# Patient Record
Sex: FEMALE | Race: ASIAN | Employment: FULL TIME | URBAN - METROPOLITAN AREA
[De-identification: names, ages, dates, MRNs, and addresses within clinical notes are randomized per-mention and may not be internally consistent; named-entity substitution may affect disease eponyms.]

---

## 2018-10-09 ENCOUNTER — HOSPITAL ENCOUNTER (OUTPATIENT)
Dept: PHYSICAL THERAPY | Age: 38
Discharge: HOME | End: 2018-10-09
Payer: COMMERCIAL

## 2018-10-09 PROCEDURE — 97110 THERAPEUTIC EXERCISES: CPT

## 2018-10-09 PROCEDURE — 97162 PT EVAL MOD COMPLEX 30 MIN: CPT

## 2018-10-09 NOTE — PROGRESS NOTES
PHYSICAL THERAPY - DAILY TREATMENT NOTE Patient Name: Franchesca Elizondo        Date: 10/9/2018 : 1980   YES Patient  Verified Visit #:     Insurance: Payor: Ermalinda Phalen / Plan: [employer]  CAPITATED PT / Product Type: Commerical / In time: 1000 Out time: 1100 Total Treatment Time: 60 Medicare/BCBS Time Tracking (below) Total Timed Codes (min):   1:1 Treatment Time:    
TREATMENT AREA =  Right knee pain [M25.561] SUBJECTIVE Pain Level (on 0 to 10 scale):  0-1  / 10 Medication Changes/New allergies or changes in medical history, any new surgeries or procedures? NO    If yes, update Summary List  
Subjective Functional Status/Changes:  []  No changes reported See POC OBJECTIVE Modalities Rationale:     decrease inflammation, decrease pain and increase tissue extensibility to improve patient's ability to perform functional ADLs 
 min [] Estim, type/location:   
                                 []  att     []  unatt     []  w/US     []  w/ice    []  w/heat 
 min []  Mechanical Traction: type/lbs   
               []  pro   []  sup   []  int   []  cont    []  before manual    []  after manual  
 min []  Ultrasound, settings/location:    
 min []  Iontophoresis w/ dexamethasone, location:   
                                           []  take home patch       []  in clinic  
 min []  Ice     []  Heat    location/position:   
 min []  Vasopneumatic Device, press/temp:   
 min []  Other:   
[] Skin assessment post-treatment (if applicable):   
[]  intact    []  redness- no adverse reaction    
[]redness  adverse reaction:     
15 min Therapeutic Exercise:  [x]  See flow sheet Rationale:      increase ROM and increase strength to improve the patients ability to regain full functional mobility of R knee for ADLS and return to yoga  
 min Manual Therapy: Technique:     
[] S/DTM []IASTM []PROM [] Passive Stretching []manual TPR  [] TDN (see objective; actual needle insertion time not billed) []Jt manipulation:Gr I [] II []  III [] IV[] V[] Treatment/Area:    
Rationale:      decrease pain, increase ROM, increase tissue extensibility and decrease trigger points to improve patient's ability to  min Therapeutic Activity:   
Rationale:    increase strength, improve coordination and increase proprioception to improve the patients ability to  
 min Neuromuscular Re-ed:   
Rationale:    improve coordination, improve balance and increase proprioception to improve the patients ability to  
 min Gait Training:   
Rationale:     min Patient Education:  YES  Reviewed HEP [x]  Progressed/Changed HEP based on:   Issued written HEP and reviewed Other Objective/Functional Measures: 
 
See POC 
TE per FS Post Treatment Pain Level (on 0 to 10) scale:   0  / 10 ASSESSMENT Assessment/Changes in Function:  
 
Progressed treatment as appropriate with good patient tolerance 
  
[]  See Progress Note/Recertification Patient will continue to benefit from skilled PT services to modify and progress therapeutic interventions, address functional mobility deficits, address ROM deficits, address strength deficits, analyze and address soft tissue restrictions, analyze and cue movement patterns, analyze and modify body mechanics/ergonomics and assess and modify postural abnormalities to attain remaining goals. Progress toward goals / Updated goals: 
Progressing towards goals established in POC PLAN [x]  Upgrade activities as tolerated YES Continue plan of care  
[]  Discharge due to :   
[]  Other:   
 
Therapist: Anita Palacios, PT, DPT, MTC, CMTPT Date: 10/9/2018 Time: 3:09 PM  
 
Future Appointments Date Time Provider [facility]lee [facility]  
10/15/2018 4:00 PM Arnulfo [facility]  
10/19/2018 8:00 AM Kirstie Wadsworth, PT [facility]  
10/22/2018 4:00 PM [facility]  
 10/26/2018 12:30 PM Kirstie Rock, PT [facility]  
10/29/2018 4:30 PM Kirstie Rock, PT [facility]  
11/2/2018 8:00 AM Brayden Basilio [facility]  
11/5/2018 4:00 PM Frank Charles [facility]  
11/9/2018 8:00 AM Francy Arredondo, PT [facility]

## 2018-10-09 NOTE — PROGRESS NOTES
[facility]  [facility] AT [address] [place] [address] 
Nasir [facility], [address] [place] [id number], [address]  Phone: [phone]  Fax: [phone] PLAN OF CARE / STATEMENT OF MEDICAL NECESSITY FOR PHYSICAL THERAPY SERVICES Patient Name: Tawanna Jones : 1863 [address]  
Diagnosis: Right knee pain [M25.561] Treatment Diagnosis: S/P R knee medial and lateral meniscectomies R knee pain Onset Date: 10/3/18 Referral Source: Vita Ar[facility]): 10/9/2018 Prior Hospitalization: See medical history Provider #: [id number] Prior Level of Function: yoag Comorbidities: L hip labral repair '12, L knee meniscectomy '07, R ACLR '07 Medications: Verified on Patient Summary List  
The Plan of Care and following information is based on the information from the initial evaluation.  
=========================================================================================== Assessment / key information: Patient is a 41 y.o. female who presents to [facility] at [facility] S/P R knee medial and lateral meniscectomies and notchplasty on 10/3/18. Patient reports intermittent R knee pain for the last 6 years that was exacerbated 5 mo ago when performing knee flexion yoga poses. She also reports R ACL laxity. Objective PT examination findings include (1) knee ROM WNL (2) good QS, no lag during SLR (3) lacking full knee ext during gait (4) swelling. A home exercise program was demonstrated and provided to address the above objective and functional deficits. Patient can benefit from skilled PT interventions to improve strength, decrease pain, to facilitate performance of ADLs & improve overall functional status. =========================================================================================== Eval Complexity: History MEDIUM  Complexity : 1-2 comorbidities / personal factors will impact the outcome/ POC ;  Examination  MEDIUM Complexity : 3 Standardized tests and measures addressing body structure, function, activity limitation and / or participation in recreation ; Presentation MEDIUM Complexity : Evolving with changing characteristics ; Decision Making MEDIUM Complexity : FOTO score of 26-74; Overall Complexity MEDIUM Problem List: pain affecting function, decrease ROM, decrease strength, edema affecting function, impaired gait/ balance, decrease ADL/ functional abilitiies, decrease activity tolerance, decrease flexibility/ joint mobility and decrease transfer abilities, FOTO score 45 Treatment Plan may include any combination of the following: Therapeutic exercise, Therapeutic activities, Neuromuscular re-education, Physical agent/modality, Gait/balance training, Manual therapy including dry needling, Aquatic therapy and Patient education Patient / Family readiness to learn indicated by: asking questions, trying to perform skills and interestPersons(s) to be included in education: patient (P) Barriers to Learning/Limitations: no 
Measures taken:   
Patient Goal (s):\"full ROM, running in 6-8 weeks, strength\" Patient self reported health status: fair Rehabilitation Potential: good? Short Term Goals: To be accomplished in  1-2  weeks: 
1) Patient to be independent and compliant with initial HEP to prevent further disability. 2) Improve FOTO score to 52 indicating significant functional improvement. 3) Patient will perform strong QS and demonstrate no lag with SLR in order to maintain stable TKE during gait. ? Long Term Goals: To be accomplished in  3-4  weeks: 
1) Patient to be independent & compliant with a progressive, high level HEP in order to maintain gains made in physical therapy. 2) Improve FOTO score to 59 indicating significant functional improvement in order to return to PLOF. 3) Patient will demonstrate good eccentric quad control in lateral step down off 6' step with no compensation to facilitate normalized gait pattern for stair negotiation. 4) Patient able to return to running with = gait components bilaterally. Frequency / Duration:   Patient to be seen  2-3  times per week for 3-4  weeks: 
Patient / Caregiver education and instruction: self care, activity modification and exercises G-Codes (GP): JOHAN Therapist Signature: Majorie Favre, PT, DPT, MTC, CMTPT Date: 10/9/2018 Certification Period: NA Time: 10:16 AM  
=========================================================================================== I certify that the above Physical Therapy Services are being furnished while the patient is under my care. I agree with the treatment plan and certify that this therapy is necessary. Physician Signature:       Date:      Time:  Please sign and return to [facility] [place] or you may fax the signed copy to [phone]. Thank you.

## 2018-10-12 ENCOUNTER — HOSPITAL ENCOUNTER (OUTPATIENT)
Dept: PHYSICAL THERAPY | Age: 38
Discharge: HOME | End: 2018-10-12
Payer: COMMERCIAL

## 2018-10-12 PROCEDURE — 97110 THERAPEUTIC EXERCISES: CPT

## 2018-10-15 ENCOUNTER — HOSPITAL ENCOUNTER (OUTPATIENT)
Dept: PHYSICAL THERAPY | Age: 38
Discharge: HOME | End: 2018-10-15
Payer: COMMERCIAL

## 2018-10-15 PROCEDURE — 97110 THERAPEUTIC EXERCISES: CPT

## 2018-10-15 NOTE — PROGRESS NOTES
PHYSICAL THERAPY - DAILY TREATMENT NOTE Patient Name: Jennifer Yousif        Date: 10/15/2018 : 1980   yes Patient  Verified Visit #:   3   of   12  Insurance: Payor: Saurav Johnson / Plan: [employer]  CAPITATED PT / Product Type: Commerical / In time: 4:00 Out time: 4:38 Total Treatment Time: 45 Medicare/BCBS Time Tracking (below) Total Timed Codes (min):  N/A 1:1 Treatment Time:  N/A  
TREATMENT AREA =  Right knee pain [M25.561] SUBJECTIVE Pain Level (on 0 to 10 scale):  1  / 10 Medication Changes/New allergies or changes in medical history, any new surgeries or procedures?    no  If yes, update Summary List  
Subjective Functional Status/Changes:  []  No changes reported \"The pain is barely there. It just feels weak. I've been doing the exercises and icing at home. \" OBJECTIVE Modalities Rationale:     decrease edema, decrease inflammation and increase tissue extensibility to improve patient's ability to perform functional ADLs 
 min [] Estim, type/location:   
                                 []  att     []  unatt     []  w/US     []  w/ice    []  w/heat 
 min []  Mechanical Traction: type/lbs   
               []  pro   []  sup   []  int   []  cont    []  before manual    []  after manual  
 min []  Ultrasound, settings/location:    
 min []  Iontophoresis w/ dexamethasone, location:   
                                           []  take home patch       []  in clinic  
10 min [x]  Ice     []  Heat    location/position: To R knee in supine with wedge post-session  
 min []  Vasopneumatic Device, press/temp:   
 min []  Other:   
[] Skin assessment post-treatment (if applicable):   
[]  intact    []  redness- no adverse reaction    
[]redness  adverse reaction:     
28 min Therapeutic Exercise:  [x]  See flow sheet Rationale:      increase ROM and increase strength to improve the patients ability to regain full, pain free R knee functional mobility for yoga activities. min Manual Therapy:   
Rationale:      min Therapeutic Activity:   
Rationale:   
 
 min Neuromuscular Re-ed:   
Rationale: 
 min Gait Training:   
Rationale:     min Patient Education:  yes  Reviewed HEP []  Progressed/Changed HEP based on: Other Objective/Functional Measures: 
 
TE per flowsheet Added 6 inch step ups Post Treatment Pain Level (on 0 to 10) scale:   0  / 10 ASSESSMENT Assessment/Changes in Function:  
 
Noted minimal extensor lag during SLR; req'd cues for quad activation prior to SLR 
  
[]  See Progress Note/Recertification Patient will continue to benefit from skilled PT services to modify and progress therapeutic interventions, address functional mobility deficits, address ROM deficits, address strength deficits, analyze and address soft tissue restrictions, analyze and cue movement patterns, analyze and modify body mechanics/ergonomics and instruct in home and community integration to attain remaining goals. Progress toward goals / Updated goals: 
Good progress towards STG #3 PLAN 
[]  Upgrade activities as tolerated yes Continue plan of care  
[]  Discharge due to :   
[]  Other:   
 
Therapist: FADY Akins Date: 10/15/2018 Time: 4:46 PM  
 
Future Appointments Date Time Provider Kaylee Day 10/15/2018 4:00 PM Korina Cortés [facility]  
10/19/2018 8:00 AM Kirstie [place]readwell [place] [facility]  
10/22/2018 4:00 PM Korina Cortés [facility]  
10/26/2018 12:30 PM Kirstie Treadwell [place] [facility]  
10/29/2018 4:30 PM Kirstie Treadwell [place] [facility]  
11/2/2018 8:00 AM Michelle Basilio [facility]  
11/5/2018 4:00 PM Korina Cortés [facility]  
11/9/2018 8:00 AM Nida Balderas [place] [facility]

## 2018-10-16 NOTE — PROGRESS NOTES
PHYSICAL THERAPY - DAILY TREATMENT NOTE Patient Name: Tatyana Urias        Date: 10/16/2018 : 1980   yes Patient  Verified Visit #:     Insurance: Payor: Shital Napoles / Plan: [facility] [employer]  CAPITATED PT / Product Type: Commerical / In time: - Out time: - Total Treatment Time: - Medicare/ [facility] Time Tracking (below) Total Timed Codes (min):  - 1:1 Treatment Time:  -  
TREATMENT AREA =  Right knee pain [M25.561] SUBJECTIVE Pain Level (on 0 to 10 scale):  -  / 10 Medication Changes/New allergies or changes in medical history, any new surgeries or procedures?    no  If yes, update Summary List  
Subjective Functional Status/Changes:  []  No changes reported SEE PAPER CHART, CC DOWN  
 
  
 
OBJECTIVE Modalities Rationale:    
 min [] Estim, type/location:   
                                 []  att     []  unatt     []  w/US     []  w/ice    []  w/heat 
 min []  Mechanical Traction: type/lbs   
               []  pro   []  sup   []  int   []  cont    []  before manual    []  after manual  
 min []  Ultrasound, settings/location:    
 min []  Iontophoresis w/ dexamethasone, location:   
                                           []  take home patch       []  in clinic  
 min []  Ice     []  Heat    location/position:   
 min []  Vasopneumatic Device, press/temp:   
 min []  Other:   
[] Skin assessment post-treatment (if applicable):   
[]  intact    []  redness- no adverse reaction    
[]redness  adverse reaction:     
 min Therapeutic Exercise:  [x]  See flow sheet Rationale:      
  min Patient Education:  yes  Reviewed HEP []  Progressed/Changed HEP based on: Other Objective/Functional Measures: SEE PAPER CHART Post Treatment Pain Level (on 0 to 10) scale:   -  / 10 ASSESSMENT Assessment/Changes in Function: SEE PAPER CHART  
  
[]  See Progress Note/Recertification Patient will continue to benefit from skilled PT services to modify and progress therapeutic interventions, address functional mobility deficits, address ROM deficits, address strength deficits, analyze and address soft tissue restrictions, analyze and cue movement patterns, analyze and modify body mechanics/ergonomics, assess and modify postural abnormalities, address imbalance/dizziness and instruct in home and community integration to attain remaining goals. Progress toward goals / Updated goals: SEE PAPERCHART   
 
PLAN [x]  Upgrade activities as tolerated yes Continue plan of care  
[]  Discharge due to :   
[]  Other:   
 
Therapist: Remi Scott PT, DPT Date: 10/16/2018 Time: 10:13 AM  
 
Future Appointments Date Time Provider Kaylee Day 10/19/2018 1:00 PM [facility]  
[facility]2018 [facility]  
10/26/2018 12:30 PM Harley Lao PT [facility]  
10/29/2018 4:30 PM Harley Lao PT [facility]  
11/2/2018 8:00 AM Jackson Basilio [facility]  
11/5/2018 4:00 PM Kiah iVvar [facility]  
11/9/2018 8:00 AM Harley Lao PT [facility]

## 2018-10-19 ENCOUNTER — HOSPITAL ENCOUNTER (OUTPATIENT)
Dept: PHYSICAL THERAPY | Age: 38
Discharge: HOME | End: 2018-10-19
Payer: COMMERCIAL

## 2018-10-19 PROCEDURE — 97110 THERAPEUTIC EXERCISES: CPT

## 2018-10-19 NOTE — PROGRESS NOTES
PHYSICAL THERAPY - DAILY TREATMENT NOTE Patient Name: New Dobbins        Date: 10/19/2018 : 1980   yes Patient  Verified Visit #:     Insurance: Payor: Silviano Vail / Plan: [employer]  CAPITATED PT / Product Type: Commerical / In time: 1:00 PM Out time: 1:43 PM  
Total Treatment Time: 37 Medicare/[employer]BS Time Tracking (below) Total Timed Codes (min):  N/A 1:1 Treatment Time:  N/A  
TREATMENT AREA =  Right knee pain [M25.561] SUBJECTIVE Pain Level (on 0 to 10 scale):  0  / 10 Medication Changes/New allergies or changes in medical history, any new surgeries or procedures?    no  If yes, update Summary List  
Subjective Functional Status/Changes:  []  No changes reported Patient currently reports no R knee pain. States that she \"notices the discomfort when squatting deep\". Also states that she's been walking her dog for 1/2 mile without pain Patient is 2.5 weeks post-op OBJECTIVE Modalities Rationale:     decrease edema, decrease inflammation and increase tissue extensibility to improve patient's ability to perform functional ADLs 
 min [] Estim, type/location:   
                                 []  att     []  unatt     []  w/US     []  w/ice    []  w/heat 
 min []  Mechanical Traction: type/lbs   
               []  pro   []  sup   []  int   []  cont    []  before manual    []  after manual  
 min []  Ultrasound, settings/location:    
 min []  Iontophoresis w/ dexamethasone, location:   
                                           []  take home patch       []  in clinic  
10 min [x]  Ice     []  Heat    location/position: To R knee in supine with wedge post-session  
 min []  Vasopneumatic Device, press/temp:   
 min []  Other:   
[] Skin assessment post-treatment (if applicable):   
[]  intact    []  redness- no adverse reaction    
[]redness  adverse reaction:     
33 min Therapeutic Exercise:  [x]  See flow sheet Rationale:      increase ROM and increase strength to improve the patients ability to regain full, pain free R knee functional mobility for recreational activities. min Manual Therapy:   
Rationale:      min Therapeutic Activity:   
Rationale:   
 
 min Neuromuscular Re-ed:   
Rationale: 
 min Gait Training:   
Rationale:     min Patient Education:  yes  Reviewed HEP []  Progressed/Changed HEP based on: Other Objective/Functional Measures: 
 
Increase repetitions with SLR and SAQ Added S/L clams and SLS on Airex Post Treatment Pain Level (on 0 to 10) scale:   0 / 10 ASSESSMENT Assessment/Changes in Function: Able to maintain strong quad set during SLR Demonstrated good tolerance with progression of therex; denies sharp pain or red flags during PT session 
  
[]  See Progress Note/Recertification Patient will continue to benefit from skilled PT services to modify and progress therapeutic interventions, address functional mobility deficits, address ROM deficits, address strength deficits, analyze and address soft tissue restrictions, analyze and cue movement patterns, analyze and modify body mechanics/ergonomics and instruct in home and community integration to attain remaining goals. Progress toward goals / Updated goals: 
Reports compliance with HEP (STG #1 met) Good progress towards STG #3 PLAN 
[]  Upgrade activities as tolerated yes Continue plan of care  
[]  Discharge due to :   
[]  Other:   
 
Therapist: FADY Fuller Date: 10/19/2018 Time: 1:57 PM  
 
Future Appointments Date Time Provider Kaylee Day 10/22/2018  4:00 PM Loral Fleischer [facility]  
10/26/2018 12:30 PM Gisela Hein [facility]  
10/29/2018  4:30 PM Gisela Hein [place] [facility]  
11/2/2018  8:00 AM Imtiaz Burrows [facility]  
11/5/2018  4:00 PM Loral Fleischer [facility]  
11/9/2018  8:00 AM Gisela Hein [facility]

## 2018-10-22 ENCOUNTER — HOSPITAL ENCOUNTER (OUTPATIENT)
Dept: PHYSICAL THERAPY | Age: 38
Discharge: HOME | End: 2018-10-22
Payer: COMMERCIAL

## 2018-10-22 PROCEDURE — 97110 THERAPEUTIC EXERCISES: CPT

## 2018-10-22 NOTE — PROGRESS NOTES
PHYSICAL THERAPY - DAILY TREATMENT NOTE Patient Name: Gladis Wang        Date: 10/22/2018 : 1980   yes Patient  Verified Visit #:     Insurance: Payor: Tan Schmid / Plan: [facility] [employer]  CAPITATED PT / Product Type: Commerical / In time: 4:00 PM Out time: 4:55 PM  
Total Treatment Time: 54 Medicare/[facility] Time Tracking (below) Total Timed Codes (min):  N/A 1:1 Treatment Time:  N/A  
TREATMENT AREA =  Right knee pain [M25.561] SUBJECTIVE Pain Level (on 0 to 10 scale):  0  / 10 Medication Changes/New allergies or changes in medical history, any new surgeries or procedures?    no  If yes, update Summary List  
Subjective Functional Status/Changes:  []  No changes reported \"I've been walking my dog in the morning for 1/2 mile and I have no issues. \" OBJECTIVE Modalities Rationale:     decrease edema, decrease inflammation and increase tissue extensibility to improve patient's ability to perform functional ADLs 
 min [] Estim, type/location:   
                                 []  att     []  unatt     []  w/US     []  w/ice    []  w/heat 
 min []  Mechanical Traction: type/lbs   
               []  pro   []  sup   []  int   []  cont    []  before manual    []  after manual  
 min []  Ultrasound, settings/location:    
 min []  Iontophoresis w/ dexamethasone, location:   
                                           []  take home patch       []  in clinic  
10 min [x]  Ice     []  Heat    location/position: To R knee in supine with wedge post-session  
 min []  Vasopneumatic Device, press/temp:   
 min []  Other:   
[] Skin assessment post-treatment (if applicable):   
[]  intact    []  redness- no adverse reaction    
[]redness  adverse reaction:     
45 min Therapeutic Exercise:  [x]  See flow sheet Rationale:      increase ROM and increase strength to improve the patients ability to regain full, pain free R knee functional mobility for recreational activities. min Manual Therapy:   
Rationale:      min Therapeutic Activity:   
Rationale:   
 
 min Neuromuscular Re-ed:   
Rationale: 
 min Gait Training:   
Rationale:     min Patient Education:  yes  Reviewed HEP []  Progressed/Changed HEP based on: Other Objective/Functional Measures: Added 1lb ankle weight to SLR and S/L hip abd Increase balance time with SLS Increase repetitions with step up, and mini squats Added SL TG and HS curls Post Treatment Pain Level (on 0 to 10) scale:   0 / 10 ASSESSMENT Assessment/Changes in Function:  
 
Demonstrated good tolerance with progression of exercises; req'd cues for slow, controlled movements during therex. []  See Progress Note/Recertification Patient will continue to benefit from skilled PT services to modify and progress therapeutic interventions, address functional mobility deficits, address ROM deficits, address strength deficits, analyze and address soft tissue restrictions, analyze and cue movement patterns, analyze and modify body mechanics/ergonomics and instruct in home and community integration to attain remaining goals. Progress toward goals / Updated goals: All STGs MET; begin progressing towards LTGs PLAN 
[]  Upgrade activities as tolerated yes Continue plan of care  
[]  Discharge due to :   
[]  Other:   
 
Therapist: FADY Marie Date: 10/22/2018 Time: 6:01 PM  
 
Future Appointments Date Time Provider Kaylee Day 10/22/2018  4:00 PM [facility]  
10/26/2018 12:30 PM Yanira Singleton PT [facility]  
10/29/2018  4:30 PM Yanira Singleton PT [facility]  
11/2/2018  8:00 AM Richelle Sullivan [facility]  
11/5/2018  4:00 PM [facility]  
11/9/2018  8:00 AM Yanira Singleton PT [facility]

## 2018-10-26 ENCOUNTER — HOSPITAL ENCOUNTER (OUTPATIENT)
Dept: PHYSICAL THERAPY | Age: 38
Discharge: HOME | End: 2018-10-26
Payer: COMMERCIAL

## 2018-10-26 PROCEDURE — 97110 THERAPEUTIC EXERCISES: CPT

## 2018-10-26 NOTE — PROGRESS NOTES
PHYSICAL THERAPY - DAILY TREATMENT NOTE Patient Name: Whitney Teofilo        Date: 10/26/2018 : 1980   YES Patient  Verified Visit #:     Insurance: Payor: Latrell Self / Plan: [employer]  CAPITATED PT / Product Type: Commerical / In time: 1230 Out time: 120 Total Treatment Time: 50 Medicare/[facility] Time Tracking (below) Total Timed Codes (min):   1:1 Treatment Time:    
TREATMENT AREA =  Right knee pain [M25.561] SUBJECTIVE Pain Level (on 0 to 10 scale):  0  / 10 Medication Changes/New allergies or changes in medical history, any new surgeries or procedures? NO    If yes, update Summary List  
Subjective Functional Status/Changes:  []  No changes reported No pain, my knee has just felt stiff the last 2 days bc of the rain OBJECTIVE Modalities Rationale:     decrease inflammation, decrease pain and increase tissue extensibility to improve patient's ability to perform functional ADLs 
 min [] Estim, type/location:   
                                 []  att     []  unatt     []  w/US     []  w/ice    []  w/heat 
 min []  Mechanical Traction: type/lbs   
               []  pro   []  sup   []  int   []  cont    []  before manual    []  after manual  
 min []  Ultrasound, settings/location:    
 min []  Iontophoresis w/ dexamethasone, location:   
                                           []  take home patch       []  in clinic  
10 min [x]  Ice     []  Heat    location/position: R knee in supine  
 min []  Vasopneumatic Device, press/temp:   
 min []  Other:   
[] Skin assessment post-treatment (if applicable):   
[x]  intact    []  redness- no adverse reaction    
[]redness  adverse reaction:     
40 min Therapeutic Exercise:  [x]  See flow sheet Rationale:      increase ROM and increase strength to improve the patients ability to regain full functional mobility of R knee for ADLS and return to yoga  
 min Manual Therapy: Technique: [] S/DTM []IASTM []PROM [] Passive Stretching  
[]manual TPR  [] TDN (see objective; actual needle insertion time not billed) []Jt manipulation:Gr I [] II []  III [] IV[] V[] Treatment/Area:    
Rationale:      decrease pain, increase ROM, increase tissue extensibility and decrease trigger points to improve patient's ability to  min Therapeutic Activity:   
Rationale:    increase strength, improve coordination and increase proprioception to improve the patients ability to  
 min Neuromuscular Re-ed:   
Rationale:    improve coordination, improve balance and increase proprioception to improve the patients ability to  
 min Gait Training:   
Rationale:     min Patient Education:  YES  Reviewed HEP []  Progressed/Changed HEP based on: Other Objective/Functional Measures: 
 
TE per FS Post Treatment Pain Level (on 0 to 10) scale:   0  / 10 ASSESSMENT Assessment/Changes in Function:  
 
Progressed treatment as appropriate with good patient tolerance to progression of eccentric quad loading TE 
  
[]  See Progress Note/Recertification Patient will continue to benefit from skilled PT services to modify and progress therapeutic interventions, address functional mobility deficits, address ROM deficits, address strength deficits, analyze and address soft tissue restrictions, analyze and cue movement patterns, analyze and modify body mechanics/ergonomics and assess and modify postural abnormalities to attain remaining goals. Progress toward goals / Updated goals: 
Met STG3 PLAN [x]  Upgrade activities as tolerated YES Continue plan of care  
[]  Discharge due to :   
[]  Other:   
 
Therapist: Marcellus Philip, PT, DPT, MTC, CMTPT Date: 10/26/2018 Time: 3:09 PM  
 
Future Appointments Date Time Provider Kaylee Day 10/29/2018  4:30 PM Ivonne Thakkar PT [address]  
11/2/2018  8:00 AM Hamida Jasso [address] [place] [address]  
11/5/2018  4:00 PM Saúl Garber [address]  
 11/9/2018  8:00 AM Bladimir Hartman, PT [place]

## 2018-10-29 ENCOUNTER — HOSPITAL ENCOUNTER (OUTPATIENT)
Dept: PHYSICAL THERAPY | Age: 38
Discharge: HOME | End: 2018-10-29
Payer: COMMERCIAL

## 2018-10-29 PROCEDURE — 97110 THERAPEUTIC EXERCISES: CPT

## 2018-10-29 NOTE — PROGRESS NOTES
PHYSICAL THERAPY - DAILY TREATMENT NOTE Patient Name: Mell Torrez        Date: 10/29/2018 : 1980   YES Patient  Verified Visit #:     Insurance: Payor: Emy Strauss / Plan: [employer]  CAPITATED PT / Product Type: Commerical / In time: 430 Out time: 520 Total Treatment Time: 50 Medicare/[facility] Time Tracking (below) Total Timed Codes (min):   1:1 Treatment Time:    
TREATMENT AREA =  Right knee pain [M25.561] SUBJECTIVE Pain Level (on 0 to 10 scale):  0  / 10 Medication Changes/New allergies or changes in medical history, any new surgeries or procedures? NO    If yes, update Summary List  
Subjective Functional Status/Changes:  []  No changes reported Feels stiff, no pain OBJECTIVE Modalities Rationale:     decrease inflammation, decrease pain and increase tissue extensibility to improve patient's ability to perform functional ADLs 
 min [] Estim, type/location:   
                                 []  att     []  unatt     []  w/US     []  w/ice    []  w/heat 
 min []  Mechanical Traction: type/lbs   
               []  pro   []  sup   []  int   []  cont    []  before manual    []  after manual  
 min []  Ultrasound, settings/location:    
 min []  Iontophoresis w/ dexamethasone, location:   
                                           []  take home patch       []  in clinic  
10 min [x]  Ice     []  Heat    location/position: R knee in supine  
 min []  Vasopneumatic Device, press/temp:   
 min []  Other:   
[] Skin assessment post-treatment (if applicable):   
[x]  intact    []  redness- no adverse reaction    
[]redness  adverse reaction:     
40 min Therapeutic Exercise:  [x]  See flow sheet Rationale:      increase ROM and increase strength to improve the patients ability to regain full functional mobility of R knee for ADLS and return to yoga  
 min Manual Therapy: Technique:     
[] S/DTM []IASTM []PROM [] Passive Stretching []manual TPR  [] TDN (see objective; actual needle insertion time not billed) []Jt manipulation:Gr I [] II []  III [] IV[] V[] Treatment/Area:    
Rationale:      decrease pain, increase ROM, increase tissue extensibility and decrease trigger points to improve patient's ability to  min Therapeutic Activity:   
Rationale:    increase strength, improve coordination and increase proprioception to improve the patients ability to  
 min Neuromuscular Re-ed:   
Rationale:    improve coordination, improve balance and increase proprioception to improve the patients ability to  
 min Gait Training:   
Rationale:     min Patient Education:  YES  Reviewed HEP []  Progressed/Changed HEP based on: Other Objective/Functional Measures: 
 
TE per FS, added monster w[address]ak 3 [address] and wall jump Post Treatment Pain Level (on 0 to 10) scale:   0  / 10 ASSESSMENT Assessment/Changes in Function:  
 
Unable to tolerate 1/2 static lunge and reported fatigue during TTG R squat at L10, quad strength remains limited but steadily improving 
  
[]  See Progress Note/Recertification Patient will continue to benefit from skilled PT services to modify and progress therapeutic interventions, address functional mobility deficits, address ROM deficits, address strength deficits, analyze and address soft tissue restrictions, analyze and cue movement patterns, analyze and modify body mechanics/ergonomics and assess and modify postural abnormalities to attain remaining goals. Progress toward goals / Updated goals: 
Met STG3, see above PLAN [x]  Upgrade activities as tolerated YES Continue plan of care  
[]  Discharge due to :   
[]  Other:   
 
Therapist: Cristobal Boone, PT, DPT, MTC, CMTPT Date: 10/29/2018 Time: 3:09 PM  
 
Future Appointments Date Time Provider Kaylee Day 11/5/2018  4:00 PM Stanislaw HAINES [facility]  
11/9/2018  8:00 AM Yunier Real, PT [facility]

## 2018-11-02 ENCOUNTER — APPOINTMENT (OUTPATIENT)
Dept: PHYSICAL THERAPY | Age: 38
End: 2018-11-02
Payer: COMMERCIAL

## 2018-11-05 ENCOUNTER — HOSPITAL ENCOUNTER (OUTPATIENT)
Dept: PHYSICAL THERAPY | Age: 38
Discharge: HOME | End: 2018-11-05
Payer: COMMERCIAL

## 2018-11-05 PROCEDURE — 97110 THERAPEUTIC EXERCISES: CPT

## 2018-11-05 NOTE — PROGRESS NOTES
PHYSICAL THERAPY - DAILY TREATMENT NOTE Patient Name: Rodrick Perez        Date: 2018 : 1980   yes Patient  Verified Visit #:     Insurance: Payor: King Gomez / Plan: [employer]  CAPITATED PT / Product Type: Commerical / In time: 4:00 PM Out time: 4:57 PM  
Total Treatment Time: 62 Medicare/[employer] Time Tracking (below) Total Timed Codes (min):  N/A 1:1 Treatment Time:  N/A  
TREATMENT AREA =  Right knee pain [M25.561] SUBJECTIVE Pain Level (on 0 to 10 scale):  0  / 10 Medication Changes/New allergies or changes in medical history, any new surgeries or procedures?    no  If yes, update Summary List  
Subjective Functional Status/Changes:  []  No changes reported SEE PN 
 
 
OBJECTIVE Modalities Rationale:     decrease edema, decrease inflammation and increase tissue extensibility to improve patient's ability to perform functional ADLs 
 min [] Estim, type/location:   
                                 []  att     []  unatt     []  w/US     []  w/ice    []  w/heat 
 min []  Mechanical Traction: type/lbs   
               []  pro   []  sup   []  int   []  cont    []  before manual    []  after manual  
 min []  Ultrasound, settings/location:    
 min []  Iontophoresis w/ dexamethasone, location:   
                                           []  take home patch       []  in clinic  
10 min [x]  Ice     []  Heat    location/position: To R knee in supine with wedge post-session  
 min []  Vasopneumatic Device, press/temp:   
 min []  Other:   
[] Skin assessment post-treatment (if applicable):   
[]  intact    []  redness- no adverse reaction    
[]redness  adverse reaction:     
47 min Therapeutic Exercise:  [x]  See flow sheet Rationale:      increase ROM and increase strength to improve the patients ability to regain full, pain free R knee functional mobility for recreational activities. min Manual Therapy:   
Rationale:      min Therapeutic Activity:   
Rationale: min Neuromuscular Re-ed:   
Rationale: 
 min Gait Training:   
Rationale:     min Patient Education:  yes  Reviewed HEP []  Progressed/Changed HEP based on: Other Objective/Functional Measures: FOTO: 59 (14 point increase) R knee MMT: 4+/5 quads and HS 
  
SEE PN Post Treatment Pain Level (on 0 to 10) scale:   0 / 10 ASSESSMENT Assessment/Changes in Function: SEE PN 
  
[]  See Progress Note/Recertification Patient will continue to benefit from skilled PT services to modify and progress therapeutic interventions, address functional mobility deficits, address ROM deficits, address strength deficits, analyze and address soft tissue restrictions, analyze and cue movement patterns, analyze and modify body mechanics/ergonomics and instruct in home and community integration to attain remaining goals. Progress toward goals / Updated goals: SEE PN  
 
 
PLAN 
[]  Upgrade activities as tolerated yes Continue plan of care  
[]  Discharge due to :   
[]  Other:   
 
Therapist: FADY Thomas Date: 11/5/2018 Time: 5:00 PM  
 
Future Appointments Date Time Provider Kaylee Day 11/5/2018  4:00 PM Jennifer VELIZCPTR [facility]  
11/9/2018  8:00 AM Bladimir Salgado, PT [facility]

## 2018-11-05 NOTE — PROGRESS NOTES
[address]  [facility]  [facility] 
[address], [address] ,[address], [address]  Phone: [phone]  Fax: [phone] PROGRESS NOTE Patient Name: Gray Guzmán :  Treatment/Medical Diagnosis: Right knee pain [M25.561] Referral Source: Donna Brooke,* Date of Initial Visit: 10/9/18 Attended Visits: 8 Missed Visits: 0  
SUMMARY OF TREATMENT Pt has attended 8 PT sessions, including an initial evaluation, for her R knee pain (s/p R knee medial and lateral menisectomy 10/3/18). PT has included therapeutic exercises, patient education, and home exercise program improving RLE strength, ROM, flexibility, and endurance; cold pack for pain control. 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 Pt is 4 weeks post-op today. States that she generally has no R knee pain throughout the work day and during her ADLs. Patient is quick to fatigue during 4 inch lateral step downs; presented increase knee valgus and increase foot discomfort. Introduced plyometric/jumping activities and will begin walk to jog program to attain return to running goals Current R knee MMT (strength): quadriceps and hamstrings 4+/5 Goal/Measure of Progress Goal Met? 1. Patient to be independent & compliant with a progressive, high level HEP in order to maintain gains made in physical therapy. Status at last Eval: n/a Current Status: Independent and compliant with basic HEP yes 2. Improve FOTO score to 59 indicating significant functional improvement in order to return to PLOF. Status at last Eval: 45 Current Status: 57 progressing 3. Patient will demonstrate good eccentric quad control in lateral step down off 6' step with no compensation to facilitate normalized gait pattern for stair negotiation Status at last Eval: n/a Current Status: See above progressing 4.  Patient able to return to running with = gait components bilaterally. Status at last Eval: n/a Current Status: Unable to assess  N/A New Goals to be achieved in __2-3__  weeks: 1. Continue with LTG #1 2. Patient to demonstrate > or = to 5-/5 R knee strength to return to recreational/gym activities. 3.  Continue with LTG #3 4. Continue with LTG #4  
 
G-Codes (GP): N/A 
RECOMMENDATIONS Patient would continue to benefit from skilled physical therapy for 1-2x/wk for 2-3 If you have any questions/comments please contact us directly at [phone]. Thank you for allowing us to assist in the care of your patient. Therapist Signature: FADY Zepeda Date: 11/5/2018 Time: 5:04 PM  
NOTE TO PHYSICIAN:  PLEASE COMPLETE THE ORDERS BELOW AND FAX TO [facility]: [phone]. If you are unable to process this request in 24 hours please contact our office: [phone]. 
 
___ I have read the above report and request that my patient continue as recommended.  
___ I have read the above report and request that my patient continue therapy with the following changes/special instructions:_________________________________________________________  
___ I have read the above report and request that my patient be discharged from therapy.   
 
Physician Signature:       Date:      Time:

## 2018-11-09 ENCOUNTER — HOSPITAL ENCOUNTER (OUTPATIENT)
Dept: PHYSICAL THERAPY | Age: 38
Discharge: HOME | End: 2018-11-09
Payer: COMMERCIAL

## 2018-11-09 PROCEDURE — 97110 THERAPEUTIC EXERCISES: CPT

## 2018-11-09 NOTE — PROGRESS NOTES
PHYSICAL THERAPY - DAILY TREATMENT NOTE Patient Name: Naomi Rodrigues        Date: 2018 : 1980   YES Patient  Verified Visit #:     Insurance: Payor: Mary Mccormick / Plan: [employer]  CAPITATED PT / Product Type: Commerical / In time: 800 Out time: 900 Total Treatment Time: 54 Medicare/[employer] Time Tracking (below) Total Timed Codes (min):   1:1 Treatment Time:    
TREATMENT AREA =  Right knee pain [M25.561] SUBJECTIVE Pain Level (on 0 to 10 scale):  2  / 10 Medication Changes/New allergies or changes in medical history, any new surgeries or procedures? NO    If yes, update Summary List  
Subjective Functional Status/Changes:  []  No changes reported Santa been sore since LV. I think it was the jumping OBJECTIVE Modalities Rationale:     decrease inflammation, decrease pain and increase tissue extensibility to improve patient's ability to perform functional ADLs 
 min [] Estim, type/location:   
                                 []  att     []  unatt     []  w/US     []  w/ice    []  w/heat 
 min []  Mechanical Traction: type/lbs   
               []  pro   []  sup   []  int   []  cont    []  before manual    []  after manual  
 min []  Ultrasound, settings/location:    
 min []  Iontophoresis w/ dexamethasone, location:   
                                           []  take home patch       []  in clinic  
10 min [x]  Ice     []  Heat    location/position: R knee in supine  
 min []  Vasopneumatic Device, press/temp:   
 min []  Other:   
[] Skin assessment post-treatment (if applicable):   
[x]  intact    []  redness- no adverse reaction    
[]redness  adverse reaction:     
45 min Therapeutic Exercise:  [x]  See flow sheet Rationale:      increase ROM and increase strength to improve the patients ability to regain full functional mobility of R knee for ADLS and return to yoga  
 min Manual Therapy: Technique:     
[] S/DTM []IASTM []PROM [] Passive Stretching []manual TPR  [] TDN (see objective; actual needle insertion time not billed) []Jt manipulation:Gr I [] II []  III [] IV[] V[] Treatment/Area:    
Rationale:      decrease pain, increase ROM, increase tissue extensibility and decrease trigger points to improve patient's ability to  min Therapeutic Activity:   
Rationale:    increase strength, improve coordination and increase proprioception to improve the patients ability to  
 min Neuromuscular Re-ed:   
Rationale:    improve coordination, improve balance and increase proprioception to improve the patients ability to  
 min Gait Training:   
Rationale:     min Patient Education:  YES  Reviewed HEP []  Progressed/Changed HEP based on: Other Objective/Functional Measures: 
 
TE per FS, held jumping and monster walks Added minisquat with tband and side step and HR off step Post Treatment Pain Level (on 0 to 10) scale:   0  / 10 ASSESSMENT Assessment/Changes in Function:  
 
VC for inc hip hinge and post WS during squats, dec c/o ant knee pain when properly executed 
  
[]  See Progress Note/Recertification Patient will continue to benefit from skilled PT services to modify and progress therapeutic interventions, address functional mobility deficits, address ROM deficits, address strength deficits, analyze and address soft tissue restrictions, analyze and cue movement patterns, analyze and modify body mechanics/ergonomics and assess and modify postural abnormalities to attain remaining goals. Progress toward goals / Updated goals: 
Met STG3 PLAN [x]  Upgrade activities as tolerated YES Continue plan of care  
[]  Discharge due to :   
[]  Other:   
 
Therapist: Katrina Badillo, PT, DPT, MTC, CMTPT Date: 11/9/2018 Time: 3:09 PM  
 
Future Appointments Date Time Provider Kaylee Day 11/14/2018  6:00 PM [facility]  
11/21/2018  6:00 PM [facility]  
11/23/2018 12:30 PM Jaylene Lomax [place] [facility]  
 11/26/2018  4:00 PM Eliza Bhatia [facility]  
11/28/2018  6:00 PM Liz Basilio [facility]  
12/3/2018  4:00 PM Eliza Bhatia [facility]  
12/5/2018  6:00 PM Melody Zuñiga, PT [facility]  
12/7/2018 12:00 PM Melody Zuñiga, PT [facility]  
12/10/2018  4:00 PM Liz Basilio [facility]  
[facility]2018  [facility]  
12/14/2018 11:30 AM Melody Zuñiga, PT [facility]  
12/17/2018  4:00 PM Praful [place]  
12/19/2018  6:00 PM Onel JOSE [facility]  
12/21/2018 12:00 PM Melody Zuñiga, PT [facility]  
12/28/2018 12:00 PM Melody Zuñiga, PT [facility]  
12/31/2018  4:00 [facility]

## 2018-11-14 ENCOUNTER — HOSPITAL ENCOUNTER (OUTPATIENT)
Dept: PHYSICAL THERAPY | Age: 38
End: 2018-11-14
Payer: COMMERCIAL

## 2018-11-21 ENCOUNTER — APPOINTMENT (OUTPATIENT)
Dept: PHYSICAL THERAPY | Age: 38
End: 2018-11-21
Payer: COMMERCIAL

## 2018-11-23 ENCOUNTER — APPOINTMENT (OUTPATIENT)
Dept: PHYSICAL THERAPY | Age: 38
End: 2018-11-23
Payer: COMMERCIAL

## 2018-11-26 ENCOUNTER — HOSPITAL ENCOUNTER (OUTPATIENT)
Dept: PHYSICAL THERAPY | Age: 38
End: 2018-11-26
Payer: COMMERCIAL

## 2018-11-28 ENCOUNTER — APPOINTMENT (OUTPATIENT)
Dept: PHYSICAL THERAPY | Age: 38
End: 2018-11-28
Payer: COMMERCIAL

## 2018-12-03 ENCOUNTER — APPOINTMENT (OUTPATIENT)
Dept: PHYSICAL THERAPY | Age: 38
End: 2018-12-03

## 2018-12-05 ENCOUNTER — APPOINTMENT (OUTPATIENT)
Dept: PHYSICAL THERAPY | Age: 38
End: 2018-12-05

## 2018-12-07 ENCOUNTER — APPOINTMENT (OUTPATIENT)
Dept: PHYSICAL THERAPY | Age: 38
End: 2018-12-07

## 2018-12-10 ENCOUNTER — APPOINTMENT (OUTPATIENT)
Dept: PHYSICAL THERAPY | Age: 38
End: 2018-12-10

## 2018-12-12 ENCOUNTER — APPOINTMENT (OUTPATIENT)
Dept: PHYSICAL THERAPY | Age: 38
End: 2018-12-12

## 2018-12-14 ENCOUNTER — APPOINTMENT (OUTPATIENT)
Dept: PHYSICAL THERAPY | Age: 38
End: 2018-12-14

## 2018-12-17 ENCOUNTER — APPOINTMENT (OUTPATIENT)
Dept: PHYSICAL THERAPY | Age: 38
End: 2018-12-17

## 2018-12-19 ENCOUNTER — APPOINTMENT (OUTPATIENT)
Dept: PHYSICAL THERAPY | Age: 38
End: 2018-12-19

## 2018-12-21 ENCOUNTER — APPOINTMENT (OUTPATIENT)
Dept: PHYSICAL THERAPY | Age: 38
End: 2018-12-21

## 2018-12-28 ENCOUNTER — APPOINTMENT (OUTPATIENT)
Dept: PHYSICAL THERAPY | Age: 38
End: 2018-12-28

## 2018-12-31 ENCOUNTER — APPOINTMENT (OUTPATIENT)
Dept: PHYSICAL THERAPY | Age: 38
End: 2018-12-31

## 2019-01-07 NOTE — PROGRESS NOTES
[facility] PHYSICAL THERAPY AT [address] [place] [address] 
[address], [address] ,[address], [address]  Phone: [phone]  Fax: [phone] DISCHARGE SUMMARY Patient Name: Naomi Rodrigues : [birth year] Treatment/Medical Diagnosis: Right knee pain [M25.561] Referral Source: Tamar Lucreo,* Date of Initial Visit: 10/9/18 Attended Visits: 9 Missed Visits: 0  
SUMMARY OF TREATMENT Pt has attended 8 PT sessions, including an initial evaluation, for her R knee pain (s/p R knee medial and lateral menisectomy 10/3/18). PT has included therapeutic exercises, patient education, and home exercise program improving RLE strength, ROM, flexibility, and endurance; cold pack for pain control. 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 Pt is 4 weeks post-op today. States that she generally has no R knee pain throughout the work day and during her ADLs. Patient is quick to fatigue during 4 inch lateral step downs; presented increase knee valgus and increase foot discomfort. Introduced plyometric/jumping activities and will begin walk to jog program to attain return to running goals Current R knee MMT (strength): quadriceps and hamstrings 4+/5 Goal/Measure of Progress Goal Met? 1. Patient to be independent & compliant with a progressive, high level HEP in order to maintain gains made in physical therapy. Status at last Eval: n/a Current Status: Independent and compliant with basic HEP yes 2. Improve FOTO score to 59 indicating significant functional improvement in order to return to PLOF. Status at last Eval: 45 Current Status: 57 progressing 3. Patient will demonstrate good eccentric quad control in lateral step down off 6' step with no compensation to facilitate normalized gait pattern for stair negotiation Status at last Eval: n/a Current Status: See above progressing 4.  Patient able to return to running with = gait components bilaterally. Status at last Eval: n/a Current Status: Unable to assess  N/A  
RECOMMENDATIONS Discharge: Above reassessment was performed on 11/5/18. Ms. Othella Lefort attended one additional visit on 11/9/18 and then requested discharge due to family emergency. If you have any questions/comments please contact us directly at [phone]. Thank you for allowing us to assist in the care of your patient. Therapist Signature: Marivel Szymanski, [place] Date: 1/7/2019   Time: 5:04 PM

## 2021-02-10 ENCOUNTER — HOSPITAL ENCOUNTER (OUTPATIENT)
Dept: PHYSICAL THERAPY | Age: 41
Discharge: HOME | End: 2021-02-10
Payer: COMMERCIAL

## 2021-02-10 PROCEDURE — 97110 THERAPEUTIC EXERCISES: CPT

## 2021-02-10 PROCEDURE — 97162 PT EVAL MOD COMPLEX 30 MIN: CPT

## 2021-02-10 NOTE — PROGRESS NOTES
[address] [facility] PHYSICAL THERAPY  [facility]  Nasir Bones [address], [address], [address]  Phone: [phone]  Fax: [phone] / [facility]  Patient Name: Bernard Bustos :    Medical   Diagnosis: Neck pain, L shoulder/arm pain Treatment Diagnosis: Neck pain [M54.2]   Onset Date: Multiple years prior to eval     Referral Source: Courtney Herrera, DO Start of Care [facility]): 2/10/2021   Prior Hospitalization: See medical history Provider #: [id number]   Prior Level of Function: Working 40 hr weeks   Comorbidities: Migraines, L hand CTS sx 21, R knee meniscus surgery 2018   Medications: Verified on Patient Summary List   The Plan of Care and following information is based on the information from the initial evaluation.   ===========================================================================================  Assessment / key information:  Patient is a 45 y.o. female who presents to [facility] with complaints of neck pain, L shoulder pain, arm pain and wrist pain. Patient reports onset of symptoms years ago with long history of grinding teeth and clenching jaw in sleep. Since then pain has begun to cause migraine headaches, L shoulder/chest pain and radiculopathy down to fingers. Patient cut tendons in L hand a year ago and has some neurological deficits remaining, had CTS release 2020 but states no relief in past 2 weeks. Main complaint is L sided neck pain and tension headaches. Reports she has not had a full night sleep in 2 years and that sleep is restless and causes her to feel worse. Pt reports 10/10 pain currently, 10/10 pain at rest and 10/10 pain at worst. Patient has tried dry needling, steroid medications and injections and has felt no relief in pain. Has a standing work desk and states that is when she is most comfortable.  First session today was focused on postural education, HEP prescription and demonstration and heat to control pain. Patient presents with the following ROM and MMT:  Cervical spine ROM: full flexion/extension with poor motor control and collapse into hyperextension, 20% limitation in L rotation and L SB, 30% limitation in R rot and R SB. Thoracic spine ROM limited by 50% with pain reported in anterior chest with thoracic extension. BL shoulders and elbows present with hypermobility into all ranges  R MMT globally 4+/5, L shoulder MMT globally 4-/5 with pain reported during L shoulder ABD  TTP to cervical spinous processes, L side Facet joints, Poor mobility of c/s vertebrae  Light touch sensation intact, tingling through first 3 fingers, reports hand has begun feeling weak. Physical Therapy services will be beneficial in order to decrease pain, improve ROM and strength, improve resting posture and aide patients ability to perform full work day pain-free.  ===========================================================================================  Eval Complexity: History MEDIUM  Complexity : 1-2 comorbidities / personal factors will impact the outcome/ POC ;  Examination  HIGH Complexity : 4+ Standardized tests and measures addressing body structure, function, activity limitation and / or participation in recreation ; Presentation HIGH Complexity : Unstable and unpredictable characteristics ;   Decision Making MEDIUM Complexity : FOTO score of 26-74; Overall Complexity MEDIUM  Problem List: pain affecting function, decrease ROM, decrease strength, edema affecting function, impaired gait/ balance, decrease ADL/ functional abilitiies and decrease activity tolerance   Treatment Plan may include any combination of the following: Therapeutic exercise, Therapeutic activities, Neuromuscular re-education, Physical agent/modality, Manual therapy, Patient education, Self Care training and Functional mobility training  Patient / Family readiness to learn indicated by: asking questions and interest  Persons(s) to be included in education: patient (P)  Barriers to Learning/Limitations: None  Measures taken, if barriers to learning:    Patient Goal (s): Patient listed none- states she wants to relieve her pain and headaches   Patient self reported health status: poor  Rehabilitation Potential: fair   Short Term Goals: To be accomplished in  3  weeks:  1. Patient will be independent and compliant with initial HEP. 2. Patient will report decreased pain levels to 5/10 in order to improve sleep and prevent headahces  3. Improve cervical ROM by 50% in order to maintain improved posture for prolonged periods. 4. Demonstrate improved LUE strength to 4/5 to improve patients ability to resume OH reaching activities   Long Term Goals: To be accomplished in  6  weeks:  1. Independent and compliant with progressive HEP to maintain gains made with discharge  2. Patient will report decreased pain levels to 1-2/10 in order to improve sleep and prevent return of headaches  3. Improve FOTO score from 43 to > or = 53 in order to demonstrate improved ability to perform ADLs without pain. 4. Perform Deep cervical flexion test for 15 seconds in order to demonstrate improved strength and ability to improve resting posture. Frequency / Duration:   Patient to be seen  1-2  times per week for 4-6  weeks:  Patient / Caregiver education and instruction: self care and exercises  Therapist Signature: Samira Guillen DPT Date: 6/07/0009   Certification Period: NA Time: 1:56 PM   ===========================================================================================  I certify that the above Physical Therapy Services are being furnished while the patient is under my care. I agree with the treatment plan and certify that this therapy is necessary.     Physician Signature:        Date:       Time:     Please sign and return to [facility] or you may fax the signed copy to [phone]. Thank you.

## 2021-02-11 NOTE — PROGRESS NOTES
PHYSICAL THERAPY - DAILY TREATMENT NOTE    Patient Name: Lee Sterling        Date: 2021  : 1980   YES Patient  Verified  Visit #:     Insurance: Payor: [employer] / Plan: [address] [place] [address] Rd PT / Product Type: Commerical /      In time: 200p Out time: 250p   Total Treatment Time: 50     BCBS/Medicare Time Tracking (below)   Total Timed Codes (min):  NA 1:1 Treatment Time:  Na     TREATMENT AREA =  Neck pain [M54.2]    SUBJECTIVE  Pain Level (on 0 to 10 scale):  10  / 10   Medication Changes/New allergies or changes in medical history, any new surgeries or procedures? NO    If yes, update Summary List   Subjective Functional Status/Changes:  []  No changes reported     Patient presents to PT with c/o L sided neck pain, L shoulder/chest pain, L arm and wrist pain and constant headache           Modalities Rationale:     decrease edema, decrease inflammation, decrease pain and increase tissue extensibility to improve patient's ability to perform pain-free ADLs.     min [] Estim, type/location:                                      []  att     []  unatt     []  w/US     []  w/ice    []  w/heat    min []  Mechanical Traction: type/lbs                   []  pro   []  sup   []  int   []  cont    []  before manual    []  after manual    min []  Ultrasound, settings/location:      min []  Iontophoresis w/ dexamethasone, location:                                               []  take home patch       []  in clinic   10 min []  Ice     [x]  Heat    location/position: C/s in supine    min []  Vasopneumatic Device, press/temp:     min []  Other:    [x] Skin assessment post-treatment (if applicable):    [x]  intact    [x]  redness- no adverse reaction     []redness  adverse reaction:        15 min Therapeutic Exercise:  [x]  See flow sheet   Rationale:      increase ROM, increase strength and improve coordination to improve the patients ability to improve resting posture       Billed With/As:   [x] TE   [] TA   [] Neuro   [] Self Care Patient Education: [x] Review HEP    [] Progressed/Changed HEP based on:   [x] positioning   [x] body mechanics   [] transfers   [x] heat/ice application    [] other:      Other Objective/Functional Measures:    See POC     Post Treatment Pain Level (on 0 to 10) scale:   8  / 10     ASSESSMENT  Assessment/Changes in Function:     See POC     []  See Progress Note/Recertification   Patient will continue to benefit from skilled PT services to modify and progress therapeutic interventions, address functional mobility deficits, address ROM deficits, address strength deficits, analyze and address soft tissue restrictions, analyze and cue movement patterns, analyze and modify body mechanics/ergonomics and assess and modify postural abnormalities to attain remaining goals.    Progress toward goals / Updated goals:    See POC for new short and long term goals     PLAN  [x]  Upgrade activities as tolerated YES Continue plan of care   []  Discharge due to :    []  Other:      Therapist: Gissel King DPT    Date: 2/11/2021 Time: 6:34 PM     Future Appointments   Date Time Provider Kaylee Day   2/18/2021 10:15 AM Kiarra Landry, PT [facility]   2/26/2021  9:30 AM [facility]

## 2021-02-18 ENCOUNTER — HOSPITAL ENCOUNTER (OUTPATIENT)
Dept: PHYSICAL THERAPY | Age: 41
Discharge: HOME | End: 2021-02-18
Payer: COMMERCIAL

## 2021-02-18 PROCEDURE — 97140 MANUAL THERAPY 1/> REGIONS: CPT

## 2021-02-18 PROCEDURE — 97110 THERAPEUTIC EXERCISES: CPT

## 2021-02-18 NOTE — PROGRESS NOTES
PHYSICAL THERAPY - DAILY TREATMENT NOTE    Patient Name: Brady Woo        Date: 2021  : 1980   YES Patient  Verified  Visit #:     Insurance: Payor: [employer] / Plan: [address] PT / Product Type: Commerical /      In time: 1010a Out time: 1100a   Total Treatment Time: 50     BCBS/Medicare Time Tracking (below)   Total Timed Codes (min):  40 1:1 Treatment Time:  40     TREATMENT AREA =  Neck pain [M54.2]  Left shoulder pain [M25.512]  Left arm pain [M79.602]    SUBJECTIVE  Pain Level (on 0 to 10 scale):  8  / 10   Medication Changes/New allergies or changes in medical history, any new surgeries or procedures? NO    If yes, update Summary List   Subjective Functional Status/Changes:  []  No changes reported     Everything hurts all the time, I definitely dont think the rain is helping           Modalities Rationale:     decrease edema, decrease inflammation, decrease pain and increase tissue extensibility to improve patient's ability to perform pain-free ADLs.     min [] Estim, type/location:                                      []  att     []  unatt     []  w/US     []  w/ice    []  w/heat    min []  Mechanical Traction: type/lbs                   []  pro   []  sup   []  int   []  cont    []  before manual    []  after manual    min []  Ultrasound, settings/location:      min []  Iontophoresis w/ dexamethasone, location:                                               []  take home patch       []  in clinic   10 min []  Ice     [x]  Heat    location/position: C/s in supine    min []  Vasopneumatic Device, press/temp:     min []  Other:    [x] Skin assessment post-treatment (if applicable):    [x]  intact    [x]  redness- no adverse reaction     []redness  adverse reaction:        30 min Therapeutic Exercise:  [x]  See flow sheet   Rationale:      increase ROM, increase strength and improve coordination to improve the patients ability to improve resting posture     10 min Manual Therapy: SOR, STM//MFR to suboccipitals and UT, gentle stretching to UT   Rationale:      decrease pain, increase ROM, increase tissue extensibility and decrease edema  to improve patient's ability to tolerate seated position  The manual therapy interventions were performed at a separate and distinct time from the therapeutic activities interventions. Billed With/As:   [x] TE   [] TA   [] Neuro   [] Self Care Patient Education: [x] Review HEP    [] Progressed/Changed HEP based on:   [] positioning   [] body mechanics   [] transfers   [] heat/ice application    [] other:      Other Objective/Functional Measures: Added supine over FR, book opener, rows, paloff press, and median nerve glide     Post Treatment Pain Level (on 0 to 10) scale:   8  / 10     ASSESSMENT  Assessment/Changes in Function:     Improvements in demonstration of chin tuck and cervical retractions. Shows hypermobility in UE but poor mobility of thoracic and cervical spine. []  See Progress Note/Recertification   Patient will continue to benefit from skilled PT services to modify and progress therapeutic interventions, address functional mobility deficits, address ROM deficits, address strength deficits, analyze and address soft tissue restrictions, analyze and cue movement patterns, analyze and modify body mechanics/ergonomics and assess and modify postural abnormalities to attain remaining goals. Progress toward goals / Updated goals:    Progressing towards STG #3.      PLAN  [x]  Upgrade activities as tolerated YES Continue plan of care   []  Discharge due to :    []  Other:      Therapist: Juan Jose Feliz DPT    Date: 2/18/2021 Time: 10:09 AM     Future Appointments   Date Time Provider Kaylee Day   2/18/2021 10:15 AM Romie Cain, PT [facility]   2/26/2021  9:30 AM Aram Daniel, PT [facility]

## 2021-02-26 ENCOUNTER — HOSPITAL ENCOUNTER (OUTPATIENT)
Dept: PHYSICAL THERAPY | Age: 41
Discharge: HOME | End: 2021-02-26
Payer: COMMERCIAL

## 2021-02-26 PROCEDURE — 97110 THERAPEUTIC EXERCISES: CPT

## 2021-02-26 PROCEDURE — 97140 MANUAL THERAPY 1/> REGIONS: CPT

## 2021-02-26 NOTE — PROGRESS NOTES
PHYSICAL THERAPY - DAILY TREATMENT NOTE    Patient Name: Melody Shaver        Date: 2021  : 1980   YES Patient  Verified  Visit #:   3   of   12  Insurance: Payor: [employer] / Plan: [address] PT / Product Type: Commerical /      In time: 9:30 A Out time: 10:25 A   Total Treatment Time: 50     BCBS/Medicare Time Tracking (below)   Total Timed Codes (min):  NA 1:1 Treatment Time:  NA     TREATMENT AREA =  Neck pain [M54.2]    SUBJECTIVE  Pain Level (on 0 to 10 scale):  10  / 10   Medication Changes/New allergies or changes in medical history, any new surgeries or procedures? NO    If yes, update Summary List   Subjective Functional Status/Changes:  []  No changes reported     Patient reports that she is feeling about the same, she isn't able to sleep at night because of the pain in her L arm.            Modalities Rationale:     decrease pain to improve patient's ability to return to pain-free ADLs   min [] Estim, type/location:                                      []  att     []  unatt     []  w/US     []  w/ice    []  w/heat    min []  Mechanical Traction: type/lbs                   []  pro   []  sup   []  int   []  cont    []  before manual    []  after manual    min []  Ultrasound, settings/location:      min []  Iontophoresis w/ dexamethasone, location:                                               []  take home patch       []  in clinic   10 min []  Ice     [x]  Heat    location/position: Supine to c/s     min []  Vasopneumatic Device, press/temp:     min []  Other:    [] Skin assessment post-treatment (if applicable):    [x]  intact    [x]  redness- no adverse reaction     []redness  adverse reaction:        25 min Therapeutic Exercise:  [x]  See flow sheet   Rationale:      increase ROM and increase strength to improve the patients ability to return to pain-free sitting at work     15 min Manual Therapy: STM/MFR to B upper c/s paraspinals, L>R, gentle c/s traction, SOR in supine Rationale:      decrease pain, increase ROM, increase tissue extensibility and decrease trigger points to improve patient's ability to return to pain-free sleeping at night  The manual therapy interventions were performed at a separate and distinct time from the therapeutic activities interventions. Billed With/As:   [] TE   [] TA   [] Neuro   [] Self Care Patient Education: [x] Review HEP    [] Progressed/Changed HEP based on:   [] positioning   [] body mechanics   [] transfers   [] heat/ice application    [] other:      Other Objective/Functional Measures:    Discussed trial of c/s roll in pillow to facilitate sleeping at night uninterrupted. Post Treatment Pain Level (on 0 to 10) scale:   10  / 10     ASSESSMENT  Assessment/Changes in Function:     No change in symptoms, patient tearful during PT, difficulty with finding position of comfort & despite relaxation techniques/emphasis on breathing     []  See Progress Note/Recertification   Patient will continue to benefit from skilled PT services to modify and progress therapeutic interventions, address functional mobility deficits, address ROM deficits, address strength deficits, analyze and address soft tissue restrictions, analyze and cue movement patterns, analyze and modify body mechanics/ergonomics, assess and modify postural abnormalities and instruct in home and community integration to attain remaining goals.    Progress toward goals / Updated goals:    No progress with goals      PLAN  []  Upgrade activities as tolerated YES Continue plan of care   []  Discharge due to :    []  Other:      Therapist: Abdiel Drummond PT    Date: 2/26/2021 Time: 9:41 AM     Future Appointments   Date Time Provider Kaylee Day   3/4/2021 11:00 AM Anayeli Juarez PT [facility]   3/18/2021 11:00 AM Anayeli Juarez PT [facility]

## 2021-03-04 ENCOUNTER — HOSPITAL ENCOUNTER (OUTPATIENT)
Dept: PHYSICAL THERAPY | Age: 41
Discharge: HOME | End: 2021-03-04
Payer: COMMERCIAL

## 2021-03-04 PROCEDURE — 97110 THERAPEUTIC EXERCISES: CPT

## 2021-03-04 PROCEDURE — 97140 MANUAL THERAPY 1/> REGIONS: CPT

## 2021-03-04 NOTE — PROGRESS NOTES
PHYSICAL THERAPY - DAILY TREATMENT NOTE    Patient Name: Radha Vazquez        Date: 3/4/2021  : 1980   YES Patient  Verified  Visit #:   4   of   15  Insurance: Payor: [employer] / Plan[address] / Product Type: Commerical /      In time: 1055 Out time: 1150   Total Treatment Time: 55     BCBS/Medicare Time Tracking (below)   Total Timed Codes (min):  NA 1:1 Treatment Time:  NA     TREATMENT AREA =  Neck pain [M54.2]  Left shoulder pain [M25.512]  Left arm pain [M79.602]    SUBJECTIVE  Pain Level (on 0 to 10 scale):  7  / 10   Medication Changes/New allergies or changes in medical history, any new surgeries or procedures? NO    If yes, update Summary List   Subjective Functional Status/Changes:  []  No changes reported     I am feeling better, im not having a panic attack this week so things are more relaxed.      Still not sleeping well, I am now having nightmares           Modalities Rationale:     decrease edema, decrease inflammation and decrease pain to improve patient's ability to perform pain-free ALs   min [] Estim, type/location:                                      []  att     []  unatt     []  w/US     []  w/ice    []  w/heat    min []  Mechanical Traction: type/lbs                   []  pro   []  sup   []  int   []  cont    []  before manual    []  after manual    min []  Ultrasound, settings/location:      min []  Iontophoresis w/ dexamethasone, location:                                               []  take home patch       []  in clinic   10 min []  Ice     [x]  Heat    location/position: C/s in supine    min []  Vasopneumatic Device, press/temp:     min []  Other:    [x] Skin assessment post-treatment (if applicable):    [x]  intact    [x]  redness- no adverse reaction     []redness - adverse reaction:        30 min Therapeutic Exercise:  [x]  See flow sheet   Rationale:      increase ROM, increase strength and improve coordination to improve the patients ability to improve resting posture     15 min Manual Therapy: SOR, STM//MFR to suboccipitals and UT, gentle stretching to UT, gentle first rib depression with diaphragmatic breathing   Rationale:      decrease pain, increase ROM and increase tissue extensibility to improve patient's ability to improve resting posture  The manual therapy interventions were performed at a separate and distinct time from the therapeutic activities interventions. Billed With/As:   [x] TE   [] TA   [] Neuro   [] Self Care Patient Education: [x] Review HEP    [] Progressed/Changed HEP based on:   [] positioning   [] body mechanics   [] transfers   [] heat/ice application    [] other:      Other Objective/Functional Measures: Added open/close book, full FR for pec stretch, and supine protraction with 2#     Post Treatment Pain Level (on 0 to 10) scale:   5  / 10     ASSESSMENT  Assessment/Changes in Function:     Improved cervical spine flexor endurance with supine exercises. Required cues to move slowly throughout all exercises and prevent UT engagement     []  See Progress Note/Recertification   Patient will continue to benefit from skilled PT services to modify and progress therapeutic interventions, address functional mobility deficits, address ROM deficits, address strength deficits, analyze and address soft tissue restrictions, analyze and cue movement patterns, analyze and modify body mechanics/ergonomics and assess and modify postural abnormalities to attain remaining goals. Progress toward goals / Updated goals:    Progressing towards STG #3.      PLAN  [x]  Upgrade activities as tolerated YES Continue plan of care   []  Discharge due to :    []  Other:      Therapist: Abby Soulier, DPT    Date: 3/4/2021 Time: 11:23 AM     Future Appointments   Date Time Provider Kaylee Day   3/18/2021 11:00 AM Donna Cardona PT [facility]

## 2021-03-12 NOTE — PROGRESS NOTES
[address] [place] [address] PHYSICAL THERAPY AT [address] [place] [address] [facility], [address] [place] [address], [address]  Phone: [phone]  Fax: [phone]  PROGRESS NOTE  Patient Name: Fredi Holden : [birth year]   Treatment/Medical Diagnosis: Neck pain [M54.2]  Left shoulder pain [M25.512]  Left arm pain [M79.602]   Referral Source: Herbert Taveras DO     Date of Initial Visit: 2/10/2021 Attended Visits: 4 Missed Visits: 0     SUMMARY OF TREATMENT  AROM of cervical/thoracic spine, strengthening of deep cervical flexors, neuromuscular re-education, postural education, ergonomic training, gentle stretching, strengthening of BLUE, nerve gliding, manual therapy to improve relaxation and cervical mobility. CURRENT STATUS  Ms. Vicky Daniels has been seen for 3 follow up visits and is showing minimal progress with pain control. Fe continues to report constant pain from -10/10 that is made worse with restless sleep, clenching of jaw and discomfort during working hours. Continues to report relentless tingling sensations in L hand, shows mild improvement with neural gliding and cervical ROM exercises. Patient reports pain is higher on days with high stress, cold weather and rain, and has arrived at session in tears due to severe discomfort. Reports only periods with tolerable pain are when she \"forgets\" about the pain with occasional sleep, during therapy and with rest.  See below for objective measures:    Goal/Measure of Progress Goal Met? 1. Patient will be independent and compliant with initial HEP. Status at last Eval: NA Current Status: established yes   2. Patient will report decreased pain levels to 5/10 in order to improve sleep and prevent headahces   Status at last Eval: 10/10 Current Status: 7/10 Progressing   3. Improve cervical ROM by 50% in order to maintain improved posture for prolonged periods.    Status at last Eval: Limited 20% in L rot and L SB,   Limited 30% in R rot and R SB Current Status: 50% improvement in AROM yes     New Goals to be achieved in __2-4__  weeks:  1. Independent and compliant with progressive HEP to maintain gains made with discharge   2. Patient will report decreased pain levels to 1-2/10 in order to improve sleep and prevent return of headaches   3. Improve FOTO score from 43 to > or = 53 in order to demonstrate improved ability to perform ADLs without pain   4. Perform Deep cervical flexion test for 15 seconds in order to demonstrate improved strength and ability to improve resting posture. RECOMMENDATIONS  Patient could benefit from further evaluation to determine the cause of constant pain. Continue with skilled PT for 2-4 more sessions to assess any further improvements in pain. If you have any questions/comments please contact us directly at [phone]. Thank you for allowing us to assist in the care of your patient. Therapist Signature: Pura Pappas DPT Date: 3/12/2021     Time: 10:30 AM   NOTE TO PHYSICIAN:  PLEASE COMPLETE THE ORDERS BELOW AND FAX TO   [facility]: [phone]. If you are unable to process this request in 24 hours please contact our office: [phone].    ___ I have read the above report and request that my patient continue as recommended.   ___ I have read the above report and request that my patient continue therapy with the following changes/special instructions:_________________________________________________________   ___ I have read the above report and request that my patient be discharged from therapy.      Physician Signature:        Date:       Time:

## 2021-03-18 ENCOUNTER — APPOINTMENT (OUTPATIENT)
Dept: PHYSICAL THERAPY | Age: 41
End: 2021-03-18
Payer: COMMERCIAL

## 2021-04-08 NOTE — PROGRESS NOTES
[address] [place] [address] PHYSICAL THERAPY AT [address] [place] [address] [facility], [address] [place] [address], [address]  Phone: [phone]  Fax: [phone] SUMMARY  Patient Name: Daniella Clemens :    Treatment/Medical Diagnosis: Neck pain [M54.2]  Left shoulder pain [M25.512]  Left arm pain [M79.602]   Referral Source: Samira Scott DO     Date of Initial Visit: 2/10/2021 Attended Visits: 4 Missed Visits: 0     SUMMARY OF TREATMENT  AROM of cervical/thoracic spine, strengthening of deep cervical flexors, neuromuscular re-education, postural education, ergonomic training, gentle stretching, strengthening of BLUE, nerve gliding, manual therapy to improve relaxation and cervical mobility. CURRENT STATUS  Ms. Susan Little was seen for 3 follow up visits before follow up with MD on 3/12/2021. Following appointment, patient called to cancel all future appointments, therefore current status is unknown. RECOMMENDATIONS  Discontinue therapy due to lack of attendance or compliance. If you have any questions/comments please contact us directly at [phone]. Thank you for allowing us to assist in the care of your patient.     Therapist Signature: Gissel King PT Date: 2021     Time: 10:05 AM